# Patient Record
Sex: MALE | Race: NATIVE HAWAIIAN OR OTHER PACIFIC ISLANDER
[De-identification: names, ages, dates, MRNs, and addresses within clinical notes are randomized per-mention and may not be internally consistent; named-entity substitution may affect disease eponyms.]

---

## 2020-10-16 ENCOUNTER — HOSPITAL ENCOUNTER (EMERGENCY)
Dept: HOSPITAL 11 - JP.ED | Age: 25
Discharge: LEFT BEFORE DISCHARGE | End: 2020-10-16
Payer: MEDICAID

## 2020-10-16 DIAGNOSIS — Z53.21: Primary | ICD-10-CM

## 2021-11-09 NOTE — EDM.PDOC
ED HPI GENERAL MEDICAL PROBLEM





- General


Chief Complaint: Abdominal Pain


Stated Complaint: SEVER ABDOMINAL PAIN-SENT BY 


Time Seen by Provider: 11/09/21 11:28


Source of Information: Reports: Patient, Family, RN Notes Reviewed


History Limitations: Reports: No Limitations





- History of Present Illness


INITIAL COMMENTS - FREE TEXT/NARRATIVE: 





25-year-old gentleman presents emergency department day complaint of abdominal 

pain, it is predominantly in left upper quadrant it started early this morning 

and last use alcohol this morning he does admit to drinking several pints of 

hard liquor per day.  No history of pancreatitis no history of abdominal 

surgeries.  He has not had any fevers.





- Related Data


                                    Allergies











Allergy/AdvReac Type Severity Reaction Status Date / Time


 


No Known Allergies Allergy   Verified 11/09/21 11:00











Home Meds: 


                                    Home Meds





NK [No Known Home Meds]  11/09/21 [History]











Past Medical History





- Past Health History


Medical/Surgical History: Denies Medical/Surgical History





Social & Family History





- Tobacco Use


Tobacco Use Status *Q: Never Tobacco User





- Caffeine Use


Caffeine Use: Reports: Coffee, Energy Drinks, Soda





- Alcohol Use


Days Per Week of Alcohol Use: 7


Number of Drinks Per Day: 8


Total Drinks Per Week: 56


Date of Last Drink: 11/09/21


Time of Last Drink: 05:00





- Recreational Drug Use


Recreational Drug Use: No





ED ROS GENERAL





- Review of Systems


Review Of Systems: See Below


Constitutional: Reports: No Symptoms


Respiratory: Reports: No Symptoms


Cardiovascular: Reports: No Symptoms


GI/Abdominal: Reports: Abdominal Pain, Diarrhea, Nausea, Vomiting


: Reports: No Symptoms





ED EXAM, GI/ABD





- Physical Exam


Exam: See Below


Exam Limited By: No Limitations


General Appearance: Alert, WD/WN, No Apparent Distress


Respiratory/Chest: No Respiratory Distress, Lungs Clear, Normal Breath Sounds, 

No Accessory Muscle Use, Chest Non-Tender


Cardiovascular: Regular Rate, Rhythm, No Murmur


GI/Abdominal Exam: Normal Bowel Sounds, Soft, No Distention, Tender (LUQ pain)





Course





- Vital Signs


Last Recorded V/S: 


                                Last Vital Signs











Temp  97.9 F   11/09/21 11:07


 


Pulse  82   11/09/21 14:52


 


Resp  16   11/09/21 11:07


 


BP  150/84 H  11/09/21 14:52


 


Pulse Ox  95   11/09/21 13:47














- Orders/Labs/Meds


Orders: 


                               Active Orders 24 hr











 Category Date Time Status


 


 Patient Status [ADT] Routine ADT  11/09/21 16:45 Active


 


 Intake and Output [RC] QSHIFT Care  11/09/21 16:46 Active


 


 Oxygen Therapy [RC] PRN Care  11/09/21 16:45 Active


 


 Peripheral IV Care [RC] .AS DIRECTED Care  11/09/21 11:35 Active


 


 Up ad Macie [RC] ASDIRECTED Care  11/09/21 16:45 Active


 


 Vital Signs [RC] Q4H Care  11/09/21 16:45 Active


 


 Nothing per Oral Now Diet [DIET] Diet  11/09/21 Dinner Active


 


 HYDROmorphone [Dilaudid] Med  11/09/21 16:27 Active





 0.5 mg IVPUSH Q1H PRN   


 


 Iopamidol [Isovue-300 (61%)] Med  11/09/21 11:51 Active





 126 ml IV .AS DIRECTED PRN   


 


 LORazepam [Ativan] Med  11/09/21 16:48 Active





 1 mg IVPUSH Q4H PRN   


 


 Ondansetron [Zofran] Med  11/09/21 16:50 Active





 4 mg IVPUSH Q8H PRN   


 


 Sodium Chloride 0.9% [Normal Saline] 1,000 ml Med  11/09/21 13:00 Active





 IV ASDIRECTED   


 


 Sodium Chloride 0.9% [Normal Saline] 1,000 ml Med  11/09/21 16:00 Active





 IV ASDIRECTED   


 


 Sodium Chloride 0.9% [Normal Saline] 1,000 ml Med  11/09/21 17:00 Active





 IV ASDIRECTED   


 


 Sodium Chloride 0.9% [Normal Saline] 100 ml Med  11/09/21 12:00 Active





 IV ASDIRECTED   


 


 Sodium Chloride 0.9% [Saline Flush] Med  11/09/21 11:34 Active





 10 ml FLUSH ASDIRECTED PRN   


 


 Isolation [COMM] Stat Oth  11/09/21 11:38 Ordered


 


 Peripheral IV Insertion Adult [OM.PC] Urgent Oth  11/09/21 11:34 Ordered








                                Medication Orders





Hydromorphone HCl (Hydromorphone 0.5 Mg/0.5 Ml Syringe)  0.5 mg IVPUSH Q1H PRN


   PRN Reason: Abdominal Pain


Sodium Chloride (Normal Saline)  100 mls @ 3 mls/sec IV ASDIRECTED JULIETA


   Last Admin: 11/09/21 11:58  Dose: 3 mls/sec


   Documented by: KATYA


Sodium Chloride (Normal Saline)  1,000 mls @ 999 mls/hr IV ASDIRECTED JULIETA


   Last Admin: 11/09/21 14:30  Dose: 999 mls/hr


   Documented by: ADALGISA


Sodium Chloride (Normal Saline)  1,000 mls @ 250 mls/hr IV ASDIRECTED JULIETA


   Last Admin: 11/09/21 16:07  Dose: 250 mls/hr


   Documented by: [id number]


Sodium Chloride (Normal Saline)  1,000 mls @ 150 mls/hr IV ASDIRECTED JULIETA


Iopamidol (Iopamidol 612 Mg/Ml 150 Ml Bottle)  126 ml IV .AS DIRECTED PRN


   PRN Reason: RADIOLOGY EXAM


   Stop: 11/10/21 11:52


   Last Admin: 11/09/21 11:58  Dose: 126 ml


   Documented by: KATYA


Lorazepam (Lorazepam 2 Mg/Ml Sdv)  1 mg IVPUSH Q4H PRN


   PRN Reason: Anxiety


Ondansetron HCl (Ondansetron 4 Mg/2 Ml Sdv)  4 mg IVPUSH Q8H PRN


   PRN Reason: Nausea/Vomiting


Sodium Chloride (Sodium Chloride 0.9% 10 Ml Syringe)  10 ml FLUSH ASDIRECTED PRN


   PRN Reason: Keep Vein Open


   Last Admin: 11/09/21 11:58  Dose: 10 ml


   Documented by: KATYA








Labs: 


                                Laboratory Tests











  11/09/21 11/09/21 11/09/21 Range/Units





  11:34 11:45 11:45 


 


WBC   8.9   (4.5-11.0)  K/uL


 


RBC   5.39   (4.30-5.90)  M/uL


 


Hgb   16.2 H   (12.0-15.0)  g/dL


 


Hct   46.7   (40.0-54.0)  %


 


MCV   87   (80-98)  fL


 


MCH   30   (27-31)  pg


 


MCHC   35   (32-36)  %


 


Plt Count   356   (150-400)  K/uL


 


Neut % (Auto)   74.7 H   (36-66)  %


 


Lymph % (Auto)   12.7 L   (24-44)  %


 


Mono % (Auto)   10.9 H   (2-6)  %


 


Eos % (Auto)   1.5 L   (2-4)  %


 


Baso % (Auto)   0.2   (0-1)  %


 


Sodium    144  (140-148)  mmol/L


 


Potassium    4.0  (3.6-5.2)  mmol/L


 


Chloride    104  (100-108)  mmol/L


 


Carbon Dioxide    24  (21-32)  mmol/L


 


Anion Gap    16.4 H  (5.0-14.0)  mmol/L


 


BUN    6 L  (7-18)  mg/dL


 


Creatinine    0.9  (0.8-1.3)  mg/dL


 


Est Cr Clr Drug Dosing    122.36  mL/min


 


Estimated GFR (MDRD)    > 60  (>60)  


 


Glucose    109 H  ()  mg/dL


 


Lactic Acid  2.3 H    (0.4-2.0)  mmol/L


 


Calcium    9.2  (8.5-10.1)  mg/dL


 


Total Bilirubin    1.0  (0.2-1.0)  mg/dL


 


AST    144 H  (15-37)  U/L


 


ALT    245 H  (12-78)  U/L


 


Alkaline Phosphatase    115  ()  U/L


 


Total Protein    7.7  (6.4-8.2)  g/dL


 


Albumin    4.3  (3.4-5.0)  g/dL


 


Globulin    3.4  (2.3-3.5)  g/dL


 


Albumin/Globulin Ratio    1.3  (1.2-2.2)  


 


Lipase    736 H  ()  U/L


 


Ethyl Alcohol     mg/dL


 


Influenza Type A RNA     (NEGATIVE)  


 


RSV RNA (INAAT)     (NEGATIVE)  


 


Influenza Type B RNA     (NEGATIVE)  


 


SARS-CoV-2 RNA (ANIYAH)     (NEGATIVE)  














  11/09/21 11/09/21 Range/Units





  12:10 16:50 


 


WBC    (4.5-11.0)  K/uL


 


RBC    (4.30-5.90)  M/uL


 


Hgb    (12.0-15.0)  g/dL


 


Hct    (40.0-54.0)  %


 


MCV    (80-98)  fL


 


MCH    (27-31)  pg


 


MCHC    (32-36)  %


 


Plt Count    (150-400)  K/uL


 


Neut % (Auto)    (36-66)  %


 


Lymph % (Auto)    (24-44)  %


 


Mono % (Auto)    (2-6)  %


 


Eos % (Auto)    (2-4)  %


 


Baso % (Auto)    (0-1)  %


 


Sodium    (140-148)  mmol/L


 


Potassium    (3.6-5.2)  mmol/L


 


Chloride    (100-108)  mmol/L


 


Carbon Dioxide    (21-32)  mmol/L


 


Anion Gap    (5.0-14.0)  mmol/L


 


BUN    (7-18)  mg/dL


 


Creatinine    (0.8-1.3)  mg/dL


 


Est Cr Clr Drug Dosing    mL/min


 


Estimated GFR (MDRD)    (>60)  


 


Glucose    ()  mg/dL


 


Lactic Acid    (0.4-2.0)  mmol/L


 


Calcium    (8.5-10.1)  mg/dL


 


Total Bilirubin    (0.2-1.0)  mg/dL


 


AST    (15-37)  U/L


 


ALT    (12-78)  U/L


 


Alkaline Phosphatase    ()  U/L


 


Total Protein    (6.4-8.2)  g/dL


 


Albumin    (3.4-5.0)  g/dL


 


Globulin    (2.3-3.5)  g/dL


 


Albumin/Globulin Ratio    (1.2-2.2)  


 


Lipase    ()  U/L


 


Ethyl Alcohol   9  mg/dL


 


Influenza Type A RNA  Negative   (NEGATIVE)  


 


RSV RNA (INAAT)  Negative   (NEGATIVE)  


 


Influenza Type B RNA  Negative   (NEGATIVE)  


 


SARS-CoV-2 RNA (ANIYAH)  Positive H   (NEGATIVE)  











Meds: 


Medications











Generic Name Dose Route Start Last Admin





  Trade Name Freq  PRN Reason Stop Dose Admin


 


Hydromorphone HCl  0.5 mg  11/09/21 16:27 





  Hydromorphone 0.5 Mg/0.5 Ml Syringe  IVPUSH  





  Q1H PRN  





  Abdominal Pain  


 


Sodium Chloride  100 mls @ 3 mls/sec  11/09/21 12:00  11/09/21 11:58





  Normal Saline  IV   3 mls/sec





  ASDIRECTED JULIETA   Administration


 


Sodium Chloride  1,000 mls @ 999 mls/hr  11/09/21 13:00  11/09/21 14:30





  Normal Saline  IV   999 mls/hr





  ASDIRECTED JULIETA   Administration


 


Sodium Chloride  1,000 mls @ 250 mls/hr  11/09/21 16:00  11/09/21 16:07





  Normal Saline  IV   250 mls/hr





  ASDIRECTED JULIETA   Administration


 


Sodium Chloride  1,000 mls @ 150 mls/hr  11/09/21 17:00 





  Normal Saline  IV  





  ASDIRECTED JULIETA  


 


Iopamidol  126 ml  11/09/21 11:51  11/09/21 11:58





  Iopamidol 612 Mg/Ml 150 Ml Bottle  IV  11/10/21 11:52  126 ml





  .AS DIRECTED PRN   Administration





  RADIOLOGY EXAM  


 


Lorazepam  1 mg  11/09/21 16:48 





  Lorazepam 2 Mg/Ml Sdv  IVPUSH  





  Q4H PRN  





  Anxiety  


 


Ondansetron HCl  4 mg  11/09/21 16:50 





  Ondansetron 4 Mg/2 Ml Sdv  IVPUSH  





  Q8H PRN  





  Nausea/Vomiting  


 


Sodium Chloride  10 ml  11/09/21 11:34  11/09/21 11:58





  Sodium Chloride 0.9% 10 Ml Syringe  FLUSH   10 ml





  ASDIRECTED PRN   Administration





  Keep Vein Open  














Discontinued Medications














Generic Name Dose Route Start Last Admin





  Trade Name Freq  PRN Reason Stop Dose Admin


 


Hydromorphone HCl  0.5 mg  11/09/21 13:06  11/09/21 13:27





  Hydromorphone 0.5 Mg/0.5 Ml Syringe  IVPUSH  11/09/21 13:07  0.5 mg





  ONETIME ONE   Administration


 


Hydromorphone HCl  1 mg  11/09/21 14:42  11/09/21 15:03





  Hydromorphone 1 Mg/Ml Syringe  IVPUSH  11/09/21 14:43  1 mg





  ONETIME ONE   Administration


 


Sodium Chloride  1,000 mls @ 999 mls/hr  11/09/21 11:34  11/09/21 12:11





  Normal Saline  IV  11/09/21 12:34  999 mls/hr





  .BOLUS STA   Administration


 


Lorazepam  1 mg  11/09/21 16:16  11/09/21 16:28





  Lorazepam 2 Mg/Ml Sdv  IVPUSH  11/09/21 16:17  1 mg





  ONETIME ONE   Administration


 


Ondansetron HCl  4 mg  11/09/21 15:45  11/09/21 16:09





  Ondansetron 4 Mg/2 Ml Sdv  IVPUSH  11/09/21 15:46  4 mg





  ONETIME ONE   Administration


 


Sodium Chloride  10 ml  11/09/21 11:51  11/09/21 12:13





  Sodium Chloride 0.9% 10 Ml Sdv  FLUSH  11/09/21 11:52  10 ml





  ONETIME ONE   Administration














Departure





- Departure


Time of Disposition: 17:25


Disposition: DC/[facility]fer to [facility]


Condition: Fair


Clinical Impression: 


 COVID-19





Pancreatitis


Qualifiers:


 Chronicity: acute Pancreatitis type: alcohol induced Acute pancreatitis 

complication: no infection or necrosis Qualified Code(s): K85.20 - Alcohol 

induced acute pancreatitis without necrosis or infection








- Discharge Information


Referrals: 


Virgie Warren DO [Primary Care Provider] - 


Forms:  ED Department Discharge





Sepsis Event Note (ED)





- Evaluation


Sepsis Screening Result: No Definite Risk





- Focused Exam


Vital Signs: 


                                   Vital Signs











  Temp Pulse Resp BP Pulse Ox


 


 11/09/21 14:52   82   150/84 H 


 


 11/09/21 13:47   77   135/87  95


 


 11/09/21 12:02   79   175/100 H 


 


 11/09/21 11:07  97.9 F  87  16  148/108 H  97


 


 11/09/21 11:00  97.9 F  87  16  148/108 H  97














- My Orders


Last 24 Hours: 


My Active Orders





11/09/21 11:34


Sodium Chloride 0.9% [Saline Flush]   10 ml FLUSH ASDIRECTED PRN 


Peripheral IV Insertion Adult [OM.PC] Urgent 





11/09/21 11:35


Peripheral IV Care [RC] .AS DIRECTED 





11/09/21 11:38


Isolation [COMM] Stat 





11/09/21 11:51


Iopamidol [Isovue-300 (61%)]   126 ml IV .AS DIRECTED PRN 





11/09/21 12:00


Sodium Chloride 0.9% [Normal Saline] 100 ml IV ASDIRECTED 





11/09/21 13:00


Sodium Chloride 0.9% [Normal Saline] 1,000 ml IV ASDIRECTED 





11/09/21 16:00


Sodium Chloride 0.9% [Normal Saline] 1,000 ml IV ASDIRECTED 





11/09/21 16:27


HYDROmorphone [Dilaudid]   0.5 mg IVPUSH Q1H PRN 





11/09/21 16:45


Patient Status [ADT] Routine 


Oxygen Therapy [RC] PRN 


Up ad Macie [RC] ASDIRECTED 


Vital Signs [RC] Q4H 





11/09/21 16:46


Intake and Output [RC] QSHIFT 





11/09/21 16:48


LORazepam [Ativan]   1 mg IVPUSH Q4H PRN 





11/09/21 16:50


Ondansetron [Zofran]   4 mg IVPUSH Q8H PRN 





11/09/21 Dinner


Nothing per Oral Now Diet [DIET] 


Sodium Chloride 0.9% [Normal Saline] 1,000 ml IV ASDIRECTED 














- Assessment/Plan


Last 24 Hours: 


My Active Orders





11/09/21 11:34


Sodium Chloride 0.9% [Saline Flush]   10 ml FLUSH ASDIRECTED PRN 


Peripheral IV Insertion Adult [OM.PC] Urgent 





11/09/21 11:35


Peripheral IV Care [RC] .AS DIRECTED 





11/09/21 11:38


Isolation [COMM] Stat 





11/09/21 11:51


Iopamidol [Isovue-300 (61%)]   126 ml IV .AS DIRECTED PRN 





11/09/21 12:00


Sodium Chloride 0.9% [Normal Saline] 100 ml IV ASDIRECTED 





11/09/21 13:00


Sodium Chloride 0.9% [Normal Saline] 1,000 ml IV ASDIRECTED 





11/09/21 16:00


Sodium Chloride 0.9% [Normal Saline] 1,000 ml IV ASDIRECTED 





11/09/21 16:27


HYDROmorphone [Dilaudid]   0.5 mg IVPUSH Q1H PRN 





11/09/21 16:45


Patient Status [ADT] Routine 


Oxygen Therapy [RC] PRN 


Up ad Macie [RC] ASDIRECTED 


Vital Signs [RC] Q4H 





11/09/21 16:46


Intake and Output [RC] QSHIFT 





11/09/21 16:48


LORazepam [Ativan]   1 mg IVPUSH Q4H PRN 





11/09/21 16:50


Ondansetron [Zofran]   4 mg IVPUSH Q8H PRN 





11/09/21 Dinner


Nothing per Oral Now Diet [DIET] 


Sodium Chloride 0.9% [Normal Saline] 1,000 ml IV ASDIRECTED 











Plan: 





Assessment





Acuity = acute





Site and laterality = pancreatitis complicated the patient with known history of

Covid 19 asymptomatic





Etiology  = probably related to alcohol





Manifestations = abdominal pain





Location of injury =  Home





Lab values = CBC unremarkable CHEM panel was unremarkable except for lactic acid

slightly elevated 2.3 with consistent lactic acidosis AST at 144  

consistent with elevated liver enzymes lipase elevated 736 CT scan describes 

inflammation around the pancreas consistent with pancreatitis he is Covid 

positive negative for influenza A and B negative for RSV





Plan


Called multiple facilities throughout the day unsuccessful in securing admission

and bed placement for him however I did place him on the list at [facility]

and they had an opening I spoke with the hospitalist on-call Dr. Frank at [phone]

who kindly excepted the patient in transport will be transported via EMS ground 

he has received 2 L of fluids, is currently on his third bag of fluids normal 

saline, 2 mg of Dilaudid 1 mg Ativan 4 mg Zofran he remains n.p.o.

















 This note was dictated using dragon voice recognition software please call with

any questions on syntax or grammar.

## 2021-11-09 NOTE — CT
Abdomen Pelvis w Cont

 

CLINICAL HISTORY: Right upper quadrant pain  

 

COMPARISON: None. 

 

TECHNIQUE: Transverse scans were obtained from the base of the lungs to the

pubic symphysis following oral contrast and IV infusion of contrast.Auto dosage

reduction and iterative reconstructiontechniques employed.

 

FINDINGS: The lung bases are clear. The liver shows diffuse fatty infiltration.

There is a 1.6 cm fatty density focus in the left lobe adjacent to the

gallbladder. The gallbladder has a normal contour. The spleen has a normal size

and shape. The pancreatic head is generally enlarged. There is fluid around the

pancreatic head and the descending and transverse duodenum as well as impression

of some duodenal thickening.. 

The adrenal glands appear normal bilaterally . The kidneys show no mass or

hydronephrosis. Small stones could be obscured by contrast.. The ureters have a

normal course and caliber. The urinary bladder has a normal contour.. The aorta

has a normal contour.

There is no suspicious retroperitoneal adenopathy. 

Small intestinal configuration is nonacute. There is gas and feces in the colon.

 

 

IMPRESSION: There is a moderate amount of fluid around the pancreatic head as

well as the descending and proximal transverse duodenum. There is impression of

generalized duodenal thickening area the pancreatic head is mildly enlarged.

Peritendinous is most likely etiology.

 

Diffuse fatty infiltration of the liver.